# Patient Record
Sex: FEMALE | Race: OTHER | Employment: FULL TIME | URBAN - METROPOLITAN AREA
[De-identification: names, ages, dates, MRNs, and addresses within clinical notes are randomized per-mention and may not be internally consistent; named-entity substitution may affect disease eponyms.]

---

## 2023-06-15 PROBLEM — T78.40XA ALLERGY, UNSPECIFIED, INITIAL ENCOUNTER: Status: ACTIVE | Noted: 2022-10-31

## 2023-06-15 PROBLEM — R07.1 CHEST PAIN ON BREATHING: Status: ACTIVE | Noted: 2022-02-17

## 2024-08-14 SDOH — HEALTH STABILITY: PHYSICAL HEALTH: ON AVERAGE, HOW MANY DAYS PER WEEK DO YOU ENGAGE IN MODERATE TO STRENUOUS EXERCISE (LIKE A BRISK WALK)?: 1 DAY

## 2024-08-14 SDOH — HEALTH STABILITY: PHYSICAL HEALTH: ON AVERAGE, HOW MANY MINUTES DO YOU ENGAGE IN EXERCISE AT THIS LEVEL?: 20 MIN

## 2024-08-16 ENCOUNTER — OFFICE VISIT (OUTPATIENT)
Dept: FAMILY MEDICINE CLINIC | Facility: CLINIC | Age: 29
End: 2024-08-16
Payer: COMMERCIAL

## 2024-08-16 VITALS
TEMPERATURE: 97.6 F | SYSTOLIC BLOOD PRESSURE: 113 MMHG | WEIGHT: 181 LBS | OXYGEN SATURATION: 96 % | RESPIRATION RATE: 16 BRPM | BODY MASS INDEX: 30.9 KG/M2 | HEIGHT: 64 IN | DIASTOLIC BLOOD PRESSURE: 73 MMHG | HEART RATE: 68 BPM

## 2024-08-16 DIAGNOSIS — Z76.89 ESTABLISHING CARE WITH NEW DOCTOR, ENCOUNTER FOR: Primary | ICD-10-CM

## 2024-08-16 DIAGNOSIS — E66.9 CLASS 1 OBESITY WITHOUT SERIOUS COMORBIDITY WITH BODY MASS INDEX (BMI) OF 31.0 TO 31.9 IN ADULT, UNSPECIFIED OBESITY TYPE: ICD-10-CM

## 2024-08-16 PROCEDURE — 99385 PREV VISIT NEW AGE 18-39: CPT | Performed by: STUDENT IN AN ORGANIZED HEALTH CARE EDUCATION/TRAINING PROGRAM

## 2024-08-16 PROCEDURE — 90651 9VHPV VACCINE 2/3 DOSE IM: CPT | Performed by: STUDENT IN AN ORGANIZED HEALTH CARE EDUCATION/TRAINING PROGRAM

## 2024-08-16 PROCEDURE — 90471 IMMUNIZATION ADMIN: CPT | Performed by: STUDENT IN AN ORGANIZED HEALTH CARE EDUCATION/TRAINING PROGRAM

## 2024-08-16 SDOH — ECONOMIC STABILITY: FOOD INSECURITY: WITHIN THE PAST 12 MONTHS, YOU WORRIED THAT YOUR FOOD WOULD RUN OUT BEFORE YOU GOT MONEY TO BUY MORE.: NEVER TRUE

## 2024-08-16 SDOH — ECONOMIC STABILITY: FOOD INSECURITY: WITHIN THE PAST 12 MONTHS, THE FOOD YOU BOUGHT JUST DIDN'T LAST AND YOU DIDN'T HAVE MONEY TO GET MORE.: NEVER TRUE

## 2024-08-16 SDOH — ECONOMIC STABILITY: INCOME INSECURITY: HOW HARD IS IT FOR YOU TO PAY FOR THE VERY BASICS LIKE FOOD, HOUSING, MEDICAL CARE, AND HEATING?: NOT HARD AT ALL

## 2024-08-16 ASSESSMENT — PATIENT HEALTH QUESTIONNAIRE - PHQ9
SUM OF ALL RESPONSES TO PHQ QUESTIONS 1-9: 1
1. LITTLE INTEREST OR PLEASURE IN DOING THINGS: NOT AT ALL
DEPRESSION UNABLE TO ASSESS: FUNCTIONAL CAPACITY MOTIVATION LIMITS ACCURACY
2. FEELING DOWN, DEPRESSED OR HOPELESS: SEVERAL DAYS
SUM OF ALL RESPONSES TO PHQ QUESTIONS 1-9: 1
SUM OF ALL RESPONSES TO PHQ QUESTIONS 1-9: 1
SUM OF ALL RESPONSES TO PHQ9 QUESTIONS 1 & 2: 1
SUM OF ALL RESPONSES TO PHQ QUESTIONS 1-9: 1

## 2024-08-16 NOTE — PROGRESS NOTES
Immunizations Administered       Name Date Dose Route    HPV, GARDASIL 9, (age 9y-45y), IM, 0.5mL 8/16/2024 0.5 mL Intramuscular    Site: Deltoid- Left    Lot: [id number]    NDC: 0495-4499-64

## 2024-08-16 NOTE — PROGRESS NOTES
[facility]    HISTORY OF PRESENT ILLNESS  Noreen Gill  is a 29 y.o. y.o. female here to establish care.    Concern for weight gain  Hair loss    Health Maintenance:    Cervical Cancer Screen/PAP: 2022 wnl  Breast Cancer Screen/Mammogram: n/a  HCV Screen: No results found for: \"HEPCAB\"   DEXA:   No results found for this or any previous visit from the past 3650 days.     Colon Cancer Screening: n/a    Smoking Status:   Tobacco Use      Smoking status: Never        Passive exposure: Never      Smokeless tobacco: Never     Lung Cancer Screening:  CT Low Dose n/a     Sexually Active: Yes  Using Contraception: Yes  Taking Prenatals: Yes    Immunizations:  Flu vaccine- Recommended every fall  COVID vaccine primary series- complete  Tetanus- Tdap   Shingrix- series not completed  RSV-not recommended  Pneumovax 23-  N/A  Prevnar 20- at age 63    Social: from [place]     Mr#: [id number]      Past Medical History:   Diagnosis Date    Acute cystitis without hematuria 06/08/2023    Allergy, unspecified, initial encounter 10/31/2022    Chest pain on breathing 02/17/2022    Constipation, unspecified 06/08/2023    Headache     Obesity        Past Surgical History:   Procedure Laterality Date    EYE SURGERY      Laser eye surgery       Family History   Problem Relation Age of Onset    Asthma Mother     Diabetes Father     Obesity Father     Psoriasis Paternal Grandmother     Cancer Sister         Half sister - tumor sarcoma    Cancer Maternal Aunt         Lupus    Lupus Maternal Aunt        No Known Allergies    Social History     Tobacco Use   Smoking Status Never    Passive exposure: Never   Smokeless Tobacco Never       Social History     Substance and Sexual Activity   Alcohol Use Yes    Alcohol/week: 3.0 standard drinks of alcohol    Types: 3 Glasses of wine per week       There is no immunization history for the selected administration types on file for this patient.    Patient Active Problem List

## 2024-08-16 NOTE — PROGRESS NOTES
Noreen Gill is a 29 y.o. female (: 1995) presenting to address:    Chief Complaint   Patient presents with    New Patient     Gaining Weight  Losing Hair  Stress  Jaw Popping   Migraines  Constipation  Kidney Stones   GI Problems       Vitals:    24 1502   BP: 113/73   Pulse: 68   Resp: 16   Temp: 97.6 °F (36.4 °C)   SpO2: 96%       \"Have you been to the ER, urgent care clinic since your last visit?  Hospitalized since your last visit?\"    NO    “Have you seen or consulted any other health care providers outside of [facility] since your last visit?”    Yes, Chiropractor         “Have you had a pap smear?”    Yes, 2 years [address]  Scheduled for new doc in 2 weeks    No cervical cancer screening on file

## 2024-08-20 ENCOUNTER — HOSPITAL ENCOUNTER (OUTPATIENT)
Facility: HOSPITAL | Age: 29
Setting detail: SPECIMEN
Discharge: HOME | End: 2024-08-23
Payer: COMMERCIAL

## 2024-08-20 DIAGNOSIS — Z76.89 ESTABLISHING CARE WITH NEW DOCTOR, ENCOUNTER FOR: ICD-10-CM

## 2024-08-20 LAB
ANION GAP SERPL CALC-SCNC: 7 MMOL/L (ref 3–18)
BUN SERPL-MCNC: 11 MG/DL (ref 7–18)
BUN/CREAT SERPL: 16 (ref 12–20)
CALCIUM SERPL-MCNC: 8.8 MG/DL (ref 8.5–10.1)
CHLORIDE SERPL-SCNC: 106 MMOL/L (ref 100–111)
CHOLEST SERPL-MCNC: 161 MG/DL
CO2 SERPL-SCNC: 25 MMOL/L (ref 21–32)
CREAT SERPL-MCNC: 0.7 MG/DL (ref 0.6–1.3)
ERYTHROCYTE [DISTWIDTH] IN BLOOD BY AUTOMATED COUNT: 12.3 % (ref 11.6–14.5)
EST. AVERAGE GLUCOSE BLD GHB EST-MCNC: 97 MG/DL
GLUCOSE SERPL-MCNC: 103 MG/DL (ref 74–99)
HBA1C MFR BLD: 5 % (ref 4.2–5.6)
HCT VFR BLD AUTO: 41 % (ref 35–45)
HDLC SERPL-MCNC: 47 MG/DL (ref 40–60)
HDLC SERPL: 3.4 (ref 0–5)
HGB BLD-MCNC: 14.1 G/DL (ref 12–16)
HIV 1+2 AB+HIV1 P24 AG SERPL QL IA: NONREACTIVE
HIV 1/2 RESULT COMMENT: NORMAL
LDLC SERPL CALC-MCNC: 73.6 MG/DL (ref 0–100)
LIPID PANEL: ABNORMAL
MCH RBC QN AUTO: 30.2 PG (ref 24–34)
MCHC RBC AUTO-ENTMCNC: 34.4 G/DL (ref 31–37)
MCV RBC AUTO: 87.8 FL (ref 78–100)
NRBC # BLD: 0 K/UL (ref 0–0.01)
NRBC BLD-RTO: 0 PER 100 WBC
PLATELET # BLD AUTO: 269 K/UL (ref 135–420)
PMV BLD AUTO: 10.9 FL (ref 9.2–11.8)
POTASSIUM SERPL-SCNC: 4 MMOL/L (ref 3.5–5.5)
RBC # BLD AUTO: 4.67 M/UL (ref 4.2–5.3)
SODIUM SERPL-SCNC: 138 MMOL/L (ref 136–145)
T4 FREE SERPL-MCNC: 1 NG/DL (ref 0.7–1.5)
TRIGL SERPL-MCNC: 202 MG/DL
TSH SERPL DL<=0.05 MIU/L-ACNC: 4.47 UIU/ML (ref 0.36–3.74)
VLDLC SERPL CALC-MCNC: 40.4 MG/DL
WBC # BLD AUTO: 7.2 K/UL (ref 4.6–13.2)

## 2024-08-20 PROCEDURE — 84443 ASSAY THYROID STIM HORMONE: CPT

## 2024-08-20 PROCEDURE — 85027 COMPLETE CBC AUTOMATED: CPT

## 2024-08-20 PROCEDURE — 87389 HIV-1 AG W/HIV-1&-2 AB AG IA: CPT

## 2024-08-20 PROCEDURE — 83036 HEMOGLOBIN GLYCOSYLATED A1C: CPT

## 2024-08-20 PROCEDURE — 86803 HEPATITIS C AB TEST: CPT

## 2024-08-20 PROCEDURE — 84439 ASSAY OF FREE THYROXINE: CPT

## 2024-08-20 PROCEDURE — 80048 BASIC METABOLIC PNL TOTAL CA: CPT

## 2024-08-20 PROCEDURE — 36415 COLL VENOUS BLD VENIPUNCTURE: CPT

## 2024-08-20 PROCEDURE — 80061 LIPID PANEL: CPT

## 2024-08-21 LAB
HCV AB SERPL QL IA: NORMAL
HCV IGG SERPL QL IA: NON REACTIVE S/CO RATIO

## 2024-11-04 ENCOUNTER — TELEPHONE (OUTPATIENT)
Dept: FAMILY MEDICINE CLINIC | Facility: CLINIC | Age: 29
End: 2024-11-04

## 2024-11-04 NOTE — TELEPHONE ENCOUNTER
Patients spouse called to get an appt with Dr Krueger for chest pain. Patient's spouse was advised per provider to be seen at the ER.

## 2024-11-06 ENCOUNTER — OFFICE VISIT (OUTPATIENT)
Dept: FAMILY MEDICINE CLINIC | Facility: CLINIC | Age: 29
End: 2024-11-06
Payer: COMMERCIAL

## 2024-11-06 VITALS
HEART RATE: 71 BPM | SYSTOLIC BLOOD PRESSURE: 133 MMHG | HEIGHT: 63 IN | DIASTOLIC BLOOD PRESSURE: 84 MMHG | WEIGHT: 182 LBS | RESPIRATION RATE: 14 BRPM | BODY MASS INDEX: 32.25 KG/M2 | OXYGEN SATURATION: 96 % | TEMPERATURE: 97.8 F

## 2024-11-06 DIAGNOSIS — R07.89 OTHER CHEST PAIN: Primary | ICD-10-CM

## 2024-11-06 PROCEDURE — 93000 ELECTROCARDIOGRAM COMPLETE: CPT | Performed by: STUDENT IN AN ORGANIZED HEALTH CARE EDUCATION/TRAINING PROGRAM

## 2024-11-06 PROCEDURE — 99215 OFFICE O/P EST HI 40 MIN: CPT | Performed by: STUDENT IN AN ORGANIZED HEALTH CARE EDUCATION/TRAINING PROGRAM

## 2024-11-06 RX ORDER — IBUPROFEN 200 MG
200 TABLET ORAL EVERY 6 HOURS PRN
COMMUNITY

## 2024-11-06 ASSESSMENT — ENCOUNTER SYMPTOMS
SHORTNESS OF BREATH: 0
CHEST TIGHTNESS: 0
SINUS PAIN: 0

## 2024-11-06 NOTE — PROGRESS NOTES
Noreen Gill is a 29 y.o. female (: 1995) presenting to address:    Chief Complaint   Patient presents with    Follow-up     Pain near neck since - Muscular- when twisting hurts more  Used Ice and Resting    Had it - Inflammation        Vitals:    24 1046   BP: 133/84   Pulse: 71   Resp: 14   Temp: 97.8 °F (36.6 °C)   SpO2: 96%       \"Have you been to the ER, urgent care clinic since your last visit?  Hospitalized since your last visit?\"    NO    “Have you seen or consulted any other health care providers outside of [facility] since your last visit?”    NO        “Have you had a pap smear?”    YES - Where:  two months ago Nurse/CMA to request most recent records if not in the chart    No cervical cancer screening on file

## 2024-11-06 NOTE — PROGRESS NOTES
[facility]    HISTORY OF PRESENT ILLNESS  Noreen Gill  is a 29 y.o. y.o. female here for acute visit    Chest pain x 4 days  -called office yesterday with symptoms and was recommended to go to ER  -pt feels it is muscular so did not go to ER but made acute visit appt here  -pain more intense since onset  -directs pain to sternum, worse with turning head or moving  -had this pain before in 2022 and was told it was due to inflammation, pain was more severe during that episode  -pt concerned because sister had cancer in hear heart and had similar symptoms when she was diagnosed    Social: from [place]     Mr#: [id number]      Past Medical History:   Diagnosis Date    Acute cystitis without hematuria 06/08/2023    Allergy, unspecified, initial encounter 10/31/2022    Chest pain on breathing 02/17/2022    Constipation, unspecified 06/08/2023    Headache     Obesity        Past Surgical History:   Procedure Laterality Date    EYE SURGERY      Laser eye surgery       Family History   Problem Relation Age of Onset    Asthma Mother     Diabetes Father     Obesity Father     Psoriasis Paternal Grandmother     Cancer Sister         Half sister - tumor sarcoma    Cancer Maternal Aunt         Lupus    Lupus Maternal Aunt        No Known Allergies    Social History     Tobacco Use   Smoking Status Never    Passive exposure: Never   Smokeless Tobacco Never       Social History     Substance and Sexual Activity   Alcohol Use Yes    Alcohol/week: 3.0 standard drinks of alcohol    Types: 3 Glasses of wine per week       Immunization History   Administered Date(s) Administered    HPV, GARDASIL 9, (age 9y-45y), IM, 0.5mL 08/16/2024       Patient Active Problem List   Diagnosis    Allergy, unspecified, initial encounter    Chest pain on breathing         Current Outpatient Medications:     ibuprofen (ADVIL;MOTRIN) 200 MG tablet, Take 1 tablet by mouth every 6 hours as needed for Pain, Disp: , Rfl:       Review

## 2024-11-07 LAB
BASOPHILS # BLD AUTO: 0 X10E3/UL (ref 0–0.2)
BASOPHILS NFR BLD AUTO: 1 %
BUN SERPL-MCNC: 13 MG/DL (ref 6–20)
BUN/CREAT SERPL: 18 (ref 9–23)
CALCIUM SERPL-MCNC: 9.1 MG/DL (ref 8.7–10.2)
CHLORIDE SERPL-SCNC: 104 MMOL/L (ref 96–106)
CO2 SERPL-SCNC: 22 MMOL/L (ref 20–29)
CREAT SERPL-MCNC: 0.71 MG/DL (ref 0.57–1)
D DIMER PPP FEU-MCNC: <0.2 MG/L FEU (ref 0–0.49)
EGFRCR SERPLBLD CKD-EPI 2021: 117 ML/MIN/1.73
EOSINOPHIL # BLD AUTO: 0.2 X10E3/UL (ref 0–0.4)
EOSINOPHIL NFR BLD AUTO: 3 %
ERYTHROCYTE [DISTWIDTH] IN BLOOD BY AUTOMATED COUNT: 12.1 % (ref 11.7–15.4)
GLUCOSE SERPL-MCNC: 85 MG/DL (ref 70–99)
HCT VFR BLD AUTO: 43.2 % (ref 34–46.6)
HGB BLD-MCNC: 14.4 G/DL (ref 11.1–15.9)
IMM GRANULOCYTES # BLD AUTO: 0 X10E3/UL (ref 0–0.1)
IMM GRANULOCYTES NFR BLD AUTO: 0 %
LYMPHOCYTES # BLD AUTO: 1.9 X10E3/UL (ref 0.7–3.1)
LYMPHOCYTES NFR BLD AUTO: 32 %
MCH RBC QN AUTO: 29.9 PG (ref 26.6–33)
MCHC RBC AUTO-ENTMCNC: 33.3 G/DL (ref 31.5–35.7)
MCV RBC AUTO: 90 FL (ref 79–97)
MONOCYTES # BLD AUTO: 0.4 X10E3/UL (ref 0.1–0.9)
MONOCYTES NFR BLD AUTO: 6 %
NEUTROPHILS # BLD AUTO: 3.6 X10E3/UL (ref 1.4–7)
NEUTROPHILS NFR BLD AUTO: 58 %
PLATELET # BLD AUTO: 253 X10E3/UL (ref 150–450)
POTASSIUM SERPL-SCNC: 4.3 MMOL/L (ref 3.5–5.2)
RBC # BLD AUTO: 4.81 X10E6/UL (ref 3.77–5.28)
SODIUM SERPL-SCNC: 141 MMOL/L (ref 134–144)
SPECIMEN STATUS REPORT: NORMAL
TROPONIN T SERPL HS-MCNC: <6 NG/L (ref 0–14)
WBC # BLD AUTO: 6.1 X10E3/UL (ref 3.4–10.8)

## 2024-11-12 ENCOUNTER — OFFICE VISIT (OUTPATIENT)
Dept: FAMILY MEDICINE CLINIC | Facility: CLINIC | Age: 29
End: 2024-11-12

## 2024-11-12 VITALS
RESPIRATION RATE: 14 BRPM | WEIGHT: 182 LBS | HEART RATE: 60 BPM | HEIGHT: 63 IN | OXYGEN SATURATION: 100 % | TEMPERATURE: 97.8 F | SYSTOLIC BLOOD PRESSURE: 110 MMHG | DIASTOLIC BLOOD PRESSURE: 67 MMHG | BODY MASS INDEX: 32.25 KG/M2

## 2024-11-12 DIAGNOSIS — R07.1 CHEST PAIN ON BREATHING: Primary | ICD-10-CM

## 2024-11-12 RX ORDER — MELOXICAM 15 MG/1
15 TABLET ORAL DAILY
Qty: 30 TABLET | Refills: 0 | Status: SHIPPED | OUTPATIENT
Start: 2024-11-12

## 2024-11-12 ASSESSMENT — ENCOUNTER SYMPTOMS
SHORTNESS OF BREATH: 0
CHEST TIGHTNESS: 0
SINUS PAIN: 0

## 2024-11-12 NOTE — PROGRESS NOTES
[facility]    HISTORY OF PRESENT ILLNESS  Noreen Gill  is a 29 y.o. y.o. female here for FU.    Chest pain  -pt feels it is muscular so did not go to ER but made acute visit appt here  -pain improved, but persists  -directs pain to sternum, worse with turning head, bending or moving  -had this pain before in 2022 and was told it was due to inflammation, pain was more severe during that episode  -pt concerned because sister had cancer in her heart and had similar symptoms when she was diagnosed at same age. Sister passed away from this diagnosis  -CXR negative, EKG reassuring, troponin and Ddimer negative  -getting a little better, but still better  -using ibuprofen for pain which doesn't help much      Mr#: [id number]      Past Medical History:   Diagnosis Date    Acute cystitis without hematuria 06/08/2023    Allergy, unspecified, initial encounter 10/31/2022    Chest pain on breathing 02/17/2022    Constipation, unspecified 06/08/2023    Headache     Obesity        Past Surgical History:   Procedure Laterality Date    EYE SURGERY      Laser eye surgery       Family History   Problem Relation Age of Onset    Asthma Mother     Diabetes Father     Obesity Father     Psoriasis Paternal Grandmother     Cancer Sister         Half sister - tumor sarcoma    Cancer Maternal Aunt         Lupus    Lupus Maternal Aunt        No Known Allergies    Social History     Tobacco Use   Smoking Status Never    Passive exposure: Never   Smokeless Tobacco Never       Social History     Substance and Sexual Activity   Alcohol Use Yes    Alcohol/week: 3.0 standard drinks of alcohol    Types: 3 Glasses of wine per week       Immunization History   Administered Date(s) Administered    HPV, GARDASIL 9, (age 9y-45y), IM, 0.5mL 08/16/2024, 11/12/2024       Patient Active Problem List   Diagnosis    Allergy, unspecified, initial encounter    Chest pain on breathing         Current Outpatient Medications:

## 2024-11-12 NOTE — PROGRESS NOTES
Noreen Gill is a 29 y.o. female (: 1995) presenting to address:    Chief Complaint   Patient presents with    Follow-up     Moving and Laying down- getting better and still there       Vitals:    24 1108   BP: 110/67   Pulse: 60   Resp: 14   Temp: 97.8 °F (36.6 °C)   SpO2: 100%       \"Have you been to the ER, urgent care clinic since your last visit?  Hospitalized since your last visit?\"    NO    “Have you seen or consulted any other health care providers outside of [facility] since your last visit?”    NO        “Have you had a pap smear?”    YES - Where:  two months ago Nurse/CMA to request most recent records if not in the chart    No cervical cancer screening on file

## 2024-11-12 NOTE — PROGRESS NOTES
Immunizations Administered       Name Date Dose Route    HPV, GARDASIL 9, (age 9y-45y), IM, 0.5mL 11/12/2024 0.5 mL Intramuscular    Site: Deltoid- Left    Lot: [id number]    NDC: 0719-9149-00

## 2024-12-09 ENCOUNTER — OFFICE VISIT (OUTPATIENT)
Age: 29
End: 2024-12-09
Payer: COMMERCIAL

## 2024-12-09 VITALS
DIASTOLIC BLOOD PRESSURE: 71 MMHG | HEART RATE: 59 BPM | SYSTOLIC BLOOD PRESSURE: 105 MMHG | OXYGEN SATURATION: 99 % | HEIGHT: 63 IN | BODY MASS INDEX: 32.6 KG/M2 | WEIGHT: 184 LBS

## 2024-12-09 DIAGNOSIS — R07.89 OTHER CHEST PAIN: Primary | ICD-10-CM

## 2024-12-09 PROCEDURE — 99204 OFFICE O/P NEW MOD 45 MIN: CPT | Performed by: INTERNAL MEDICINE

## 2024-12-09 NOTE — PATIENT INSTRUCTIONS
Testing   Echo    Echo  PATIENT PREPS:   -Wear easy to remove shirt to your appointment for easier accessibility.      **call office 3-5 days after testing is completed for results** Please ensure testing is completed prior to scheduled follow up appointment. If it is not completed your appointment may be rescheduled**    New Medication/Medication Changes/Medication Refill    Medrol dose pack    **please allow 24-48 hrs for medication to be escribed to pharmacy** If you need any refills on medications please contact your pharmacy so that the request can be escribed to the provider for review seven to 10 days prior to being out of medication.

## 2024-12-09 NOTE — PROGRESS NOTES
Identified pt with two pt identifiers(name and ). Reviewed record in preparation for visit and have obtained necessary documentation.    Noreen Gill presents today for   Chief Complaint   Patient presents with    New Patient     Chest pain on breathing/Referral from [facility]/Recs in chart       Pt c/o SOB, CHEST PAIN/ PRESSURE, FATIGUE/WEAKNESS,             Noreen Gill preferred language for health care discussion is english/other.    Personal Protective Equipment:   Personal Protective Equipment was used including: mask-surgical and hands-gloves. Patient was placed on no precaution(s). Patient was not masked.    Precautions:   Patient currently on None  Patient currently roomed with door closed.    Is someone accompanying this pt? yes    Is the patient using any DME equipment during OV? no    Depression Screenin/16/2024     3:01 PM   PHQ-9 Questionaire   Little interest or pleasure in doing things 0   Feeling down, depressed, or hopeless 1   PHQ-9 Total Score 1        Learning Assessment:  No question data found.    Abuse Screenin/9/2024     2:00 PM   AMB Abuse Screening   Do you ever feel afraid of your partner? N   Are you in a relationship with someone who physically or mentally threatens you? N   Is it safe for you to go home? Y          Fall Risk      2024     2:58 PM   Fall Risk   Do you feel unsteady or are you worried about falling?  no   2 or more falls in past year? no   Fall with injury in past year? no         Pt currently taking Anticoagulant /Antiplatelet therapy? no    Coordination of Care:  1. Have you been to the ER, urgent care clinic since your last visit? Hospitalized since your last visit? no    2. Have you seen or consulted any other health care providers outside of the [facility] since your last visit? Include any pap smears or colon screening. no      Please see Red banners under Allergies and Med Rec to remove outside inquires. All correct

## 2024-12-24 RX ORDER — METHYLPREDNISOLONE 4 MG/1
TABLET ORAL
Qty: 1 KIT | Refills: 0 | Status: SHIPPED | OUTPATIENT
Start: 2024-12-24

## 2024-12-24 NOTE — PROGRESS NOTES
[facility] -  Office Consult Note     Primary Care Physician:  Kiera Krueger DO     Assessment:     Chest pain  Hypertriglyceridemia                 Plan:   Will order echocardiogram.  Order Medrol Dosepak  Return in 3 weeks.       History of Present Illness:     This is a 29 y.o. woman that was in the office today for further evaluation of chest pain.  Patient reports she was seen in the ED approximately 2 weeks ago with the same complaint.  Her pain is localized to the middle of her chest and slightly to the left.  She reported the pain was worsening.  It was particularly more severe with movement.  Activities such as twisting at the waist would commonly intensify her chest discomfort.  She had no recent fever.  She had no radiation of the discomfort.  She reported that her sister had a cardiac sarcoma which concerned her.          Cardiac risk factors: Hypertriglyceridemia      Review of Symptoms:  Except as stated above include:  Constitutional:  negative  Respiratory:  negative  Cardiovascular:  negative  Gastrointestinal: negative  Genitourinary:  negative  Musculoskeletal:  negative  Neurological:  negative       Past Medical History:     Past Medical History:   Diagnosis Date    Acute cystitis without hematuria 06/08/2023    Allergy, unspecified, initial encounter 10/31/2022    Chest pain on breathing 02/17/2022    Constipation, unspecified 06/08/2023    Headache     Obesity          Social History:     Social History     Socioeconomic History    Marital status:      Spouse name: None    Number of children: None    Years of education: None    Highest education level: None   Tobacco Use    Smoking status: Never     Passive exposure: Never    Smokeless tobacco: Never   Vaping Use    Vaping status: Unknown   Substance and Sexual Activity    Alcohol use: Yes     Alcohol/week: 3.0 standard drinks of alcohol     Types: 3 Glasses of wine per week    Drug use: Never    Sexual activity:

## 2025-01-09 ENCOUNTER — TELEPHONE (OUTPATIENT)
Age: 30
End: 2025-01-09

## 2025-01-09 NOTE — TELEPHONE ENCOUNTER
Verbal order and read back per Anjel Richter MD  Normal left ventricular systolic function. EF by 2D [employer] Biplane is 61%. Normal wall motion. Normal diastolic function.     Called patient with results from echocardiogram. Patient verbalized understanding.

## 2025-02-12 ENCOUNTER — OFFICE VISIT (OUTPATIENT)
Age: 30
End: 2025-02-12
Payer: COMMERCIAL

## 2025-02-12 VITALS
DIASTOLIC BLOOD PRESSURE: 63 MMHG | BODY MASS INDEX: 33.31 KG/M2 | HEART RATE: 59 BPM | SYSTOLIC BLOOD PRESSURE: 112 MMHG | OXYGEN SATURATION: 97 % | HEIGHT: 63 IN | WEIGHT: 188 LBS

## 2025-02-12 DIAGNOSIS — R07.89 OTHER CHEST PAIN: Primary | ICD-10-CM

## 2025-02-12 PROCEDURE — 99214 OFFICE O/P EST MOD 30 MIN: CPT | Performed by: INTERNAL MEDICINE

## 2025-02-12 ASSESSMENT — PATIENT HEALTH QUESTIONNAIRE - PHQ9
1. LITTLE INTEREST OR PLEASURE IN DOING THINGS: NOT AT ALL
SUM OF ALL RESPONSES TO PHQ QUESTIONS 1-9: 0
2. FEELING DOWN, DEPRESSED OR HOPELESS: NOT AT ALL
SUM OF ALL RESPONSES TO PHQ QUESTIONS 1-9: 0
SUM OF ALL RESPONSES TO PHQ9 QUESTIONS 1 & 2: 0
SUM OF ALL RESPONSES TO PHQ QUESTIONS 1-9: 0
SUM OF ALL RESPONSES TO PHQ QUESTIONS 1-9: 0

## 2025-02-20 NOTE — PROGRESS NOTES
[facility] -  Office Follow-up note     Primary Care Physician:  Kiera Krueger DO     Assessment:     Chest pain, most consistent with costochondritis  Hypertriglyceridemia                 Plan:   Echocardiogram completed on 12/10/2024.  Normal left ventricular systolic function.  EF 61%.  Normal diastolic function.  No significant valvular pathology.  Ordered Medrol Dosepak at previous visit.  Pain much improved.  Return in 6 months       History of Present Illness:     This is a 29 y.o. woman that was in the office today for further evaluation of chest pain.  Patient reports she was seen in the ED with the complaint.  Her pain was localized to the middle of her chest and slightly to the left.  She reported the pain was worsening.  It was particularly more severe with movement.  Activities such as twisting at the waist would commonly intensify her chest discomfort.  She had no recent fever.  She had no radiation of the discomfort.  She reported that her sister had a cardiac sarcoma which concerned her.    An echocardiogram was ordered with results summarized above.  Her physical exam at that time was remarkable for increased tenderness to palpation in the left fourth costochondral junction she was given a Medrol Dosepak in the above-mentioned echocardiogram was ordered.  She had improvement in her symptoms.          Cardiac risk factors: Hypertriglyceridemia      Review of Symptoms:  Except as stated above include:  Constitutional:  negative  Respiratory:  negative  Cardiovascular:  negative  Gastrointestinal: negative  Genitourinary:  negative  Musculoskeletal:  negative  Neurological:  negative       Past Medical History:     Past Medical History:   Diagnosis Date    Acute cystitis without hematuria 06/08/2023    Allergy, unspecified, initial encounter 10/31/2022    Chest pain on breathing 02/17/2022    Constipation, unspecified 06/08/2023    Headache     Obesity          Social History: 
see Red banners under Allergies and Med Rec to remove outside inquires. All correct information has been verified with patient and added to chart.     Medication's patient's would liked removed has been marked not taking to be removed per Verbal order and read back per Anjel Richter MD